# Patient Record
Sex: MALE | Race: WHITE | NOT HISPANIC OR LATINO | Employment: FULL TIME | URBAN - METROPOLITAN AREA
[De-identification: names, ages, dates, MRNs, and addresses within clinical notes are randomized per-mention and may not be internally consistent; named-entity substitution may affect disease eponyms.]

---

## 2021-03-08 ENCOUNTER — TELEPHONE (OUTPATIENT)
Dept: OTHER | Facility: OTHER | Age: 26
End: 2021-03-08

## 2021-03-08 ENCOUNTER — OFFICE VISIT (OUTPATIENT)
Dept: URGENT CARE | Facility: CLINIC | Age: 26
End: 2021-03-08

## 2021-03-08 DIAGNOSIS — Z11.59 SCREENING EXAMINATION FOR POLIOMYELITIS: Primary | ICD-10-CM

## 2021-03-08 LAB — SARS-COV-2 RNA RESP QL NAA+PROBE: POSITIVE

## 2021-03-08 PROCEDURE — U0003 INFECTIOUS AGENT DETECTION BY NUCLEIC ACID (DNA OR RNA); SEVERE ACUTE RESPIRATORY SYNDROME CORONAVIRUS 2 (SARS-COV-2) (CORONAVIRUS DISEASE [COVID-19]), AMPLIFIED PROBE TECHNIQUE, MAKING USE OF HIGH THROUGHPUT TECHNOLOGIES AS DESCRIBED BY CMS-2020-01-R: HCPCS

## 2021-03-08 PROCEDURE — U0005 INFEC AGEN DETEC AMPLI PROBE: HCPCS

## 2021-03-09 ENCOUNTER — TELEPHONE (OUTPATIENT)
Dept: OTHER | Facility: OTHER | Age: 26
End: 2021-03-09

## 2021-03-09 NOTE — TELEPHONE ENCOUNTER
The patient was called for notification of a test result for COVID-19  The patient did not answer the phone and a voicemail was left requesting a call back to [phone], Option 7

## 2021-03-09 NOTE — TELEPHONE ENCOUNTER
Second attempt: The patient was called for notification of a test result for COVID-19  The patient did not answer the phone and a voicemail was left requesting a call back to [phone], Option 7

## 2021-03-09 NOTE — RESULT ENCOUNTER NOTE
Third call attempt  Patient was called for COVID-19 test results by Joss Yang RN  A voicemail was received and a message was left to call back

## 2021-03-09 NOTE — TELEPHONE ENCOUNTER
4th attempt  Pt removed from call queue  The patient was called for notification of a test result for COVID-19       The patient did not answer the phone and phone continues to ring  Unable to leave voicemail at this time

## 2021-03-09 NOTE — TELEPHONE ENCOUNTER
The patient was called for notification of a test result for COVID-19  The patient did not answer the phone  The phone did not ring, I called into the department and had one of the operators attempt to call but she did not hear any ringing either  Unable to leave message

## 2021-03-09 NOTE — TELEPHONE ENCOUNTER
1st attempt  The patient was called for notification of a test result for COVID-19  The patient did not answer the phone and phone continues to ring  Unable to leave voicemail at this time

## 2024-03-29 ENCOUNTER — APPOINTMENT (OUTPATIENT)
Dept: RADIOLOGY | Facility: CLINIC | Age: 29
End: 2024-03-29
Payer: OTHER GOVERNMENT

## 2024-03-29 ENCOUNTER — OFFICE VISIT (OUTPATIENT)
Dept: URGENT CARE | Facility: CLINIC | Age: 29
End: 2024-03-29
Payer: OTHER GOVERNMENT

## 2024-03-29 VITALS
RESPIRATION RATE: 18 BRPM | TEMPERATURE: 97.3 F | SYSTOLIC BLOOD PRESSURE: 138 MMHG | HEART RATE: 73 BPM | OXYGEN SATURATION: 99 % | DIASTOLIC BLOOD PRESSURE: 68 MMHG

## 2024-03-29 DIAGNOSIS — M25.571 ACUTE RIGHT ANKLE PAIN: ICD-10-CM

## 2024-03-29 DIAGNOSIS — S93.401A SPRAIN OF RIGHT ANKLE, UNSPECIFIED LIGAMENT, INITIAL ENCOUNTER: Primary | ICD-10-CM

## 2024-03-29 PROCEDURE — 73610 X-RAY EXAM OF ANKLE: CPT

## 2024-03-29 PROCEDURE — 99213 OFFICE O/P EST LOW 20 MIN: CPT | Performed by: NURSE PRACTITIONER

## 2024-03-29 PROCEDURE — G0463 HOSPITAL OUTPT CLINIC VISIT: HCPCS | Performed by: NURSE PRACTITIONER

## 2024-03-29 NOTE — PROGRESS NOTES
[facility]        NAME: Kiko Osorio is a 28 y.o. male  : 1995    MRN: [id number]  DATE: 2024  TIME: 1:35 PM    Assessment and Plan   Sprain of right ankle, unspecified ligament, initial encounter [S93.401A]  1. Sprain of right ankle, unspecified ligament, initial encounter  Ambulatory Referral to Orthopedic Surgery      2. Acute right ankle pain  XR ankle 3+ vw right    Ambulatory Referral to Orthopedic Surgery        Acute symptomatic following a fall that occurred yesterday.  Wet read x-ray negative will Heed radiology read.  Discussed with patient ice elevate as tolerates over-the-counter ibuprofen as needed for pain cam boot applied in office.  Refusing crutches at this time.  Will place referral to orthopedics with no improvement or worsening symptoms should follow-up    Patient Instructions       Follow up with PCP in 3-5 days.  Proceed to  ER if symptoms worsen.    If tests have been performed at [facility], our office will contact you with results if changes need to be made to the care plan discussed with you at the visit.  [facility]    Chief Complaint     Chief Complaint   Patient presents with   • Ankle Pain     Patient c/o right ankle pain after he twisted it when taking out the trash.         History of Present Illness       Patient is a 28-year-old male arrives with right ankle pain.  Reports that he was taking his trash out he missed a step and twisted his ankle.  Now with swelling and minor bruising.  Does report hearing a pop.  Most pain anterior ankle and lateral aspect.  Difficulty with weightbearing and walking.  Denies numbness tingling neurovascular intact        Review of Systems   Review of Systems   Constitutional:  Negative for activity change, appetite change, chills, fatigue and fever.   HENT:  Negative for congestion, rhinorrhea, sinus pressure, sinus pain and sore throat.    Respiratory:  Negative for cough, chest  tightness and shortness of breath.    Gastrointestinal:  Negative for constipation, diarrhea, nausea and vomiting.   Musculoskeletal:  Positive for arthralgias, gait problem, joint swelling and myalgias.   Skin:  Negative for color change, pallor and rash.   Neurological:  Negative for dizziness, syncope, weakness, light-headedness and headaches.   Hematological:  Negative for adenopathy.   Psychiatric/Behavioral:  Negative for agitation and confusion.          Current Medications     No current outpatient medications on file.    Current Allergies     Allergies as of 03/29/2024   • (No Known Allergies)            The following portions of the patient's history were reviewed and updated as appropriate: allergies, current medications, past family history, past medical history, past social history, past surgical history and problem list.     History reviewed. No pertinent past medical history.    History reviewed. No pertinent surgical history.    History reviewed. No pertinent family history.      Medications have been verified.        Objective   /68   Pulse 73   Temp (!) 97.3 °F (36.3 °C)   Resp 18   SpO2 99%   No LMP for male patient.       Physical Exam     Physical Exam  Vitals and nursing note reviewed.   Constitutional:       General: He is not in acute distress.     Appearance: Normal appearance. He is not ill-appearing, toxic-appearing or diaphoretic.   HENT:      Head: Normocephalic and atraumatic.      Nose: No congestion or rhinorrhea.      Mouth/Throat:      Mouth: Mucous membranes are moist.   Eyes:      General: No scleral icterus.        Right eye: No discharge.         Left eye: No discharge.      Conjunctiva/sclera: Conjunctivae normal.   Pulmonary:      Effort: Pulmonary effort is normal. No respiratory distress.   Musculoskeletal:         General: Swelling, tenderness and signs of injury present.      Cervical back: Normal range of motion.      Right ankle: Swelling and ecchymosis  present. Tenderness present over the lateral malleolus. Decreased range of motion. Normal pulse.   Skin:     General: Skin is dry.   Neurological:      Mental Status: He is alert and oriented to person, place, and time.   Psychiatric:         Mood and Affect: Mood normal.         Behavior: Behavior normal.         Thought Content: Thought content normal.         Judgment: Judgment normal.

## 2024-04-02 ENCOUNTER — TELEPHONE (OUTPATIENT)
Age: 29
End: 2024-04-02

## 2024-04-04 ENCOUNTER — TELEPHONE (OUTPATIENT)
Age: 29
End: 2024-04-04

## 2024-04-04 ENCOUNTER — OFFICE VISIT (OUTPATIENT)
Age: 29
End: 2024-04-04
Payer: OTHER GOVERNMENT

## 2024-04-04 VITALS
BODY MASS INDEX: 23.74 KG/M2 | DIASTOLIC BLOOD PRESSURE: 74 MMHG | WEIGHT: 185 LBS | SYSTOLIC BLOOD PRESSURE: 118 MMHG | HEART RATE: 66 BPM | HEIGHT: 74 IN

## 2024-04-04 DIAGNOSIS — M25.571 ACUTE RIGHT ANKLE PAIN: ICD-10-CM

## 2024-04-04 DIAGNOSIS — S93.401A SPRAIN OF RIGHT ANKLE, UNSPECIFIED LIGAMENT, INITIAL ENCOUNTER: Primary | ICD-10-CM

## 2024-04-04 PROCEDURE — 99203 OFFICE O/P NEW LOW 30 MIN: CPT | Performed by: ORTHOPAEDIC SURGERY

## 2024-04-04 NOTE — TELEPHONE ENCOUNTER
I called Kiko back to inform him the work note is in my chart. I offered to send it to to an email.

## 2024-04-04 NOTE — TELEPHONE ENCOUNTER
Caller: Patient    Doctor: Agustin    Reason for call: Patient requested that the doctor add to the work status note that he has a tear in ankle and that he isn't allowed to drive while wearing the boot. Please upload to [employer].     Call back#: [phone]

## 2024-04-04 NOTE — LETTER
April 4, 2024     Patient: Kiko Osorio  YOB: 1995  Date of Visit: 4/4/2024      To Whom it May Concern:    Kiko Osorio is under my professional care. Kiko was seen in my office on 4/4/2024. Patient is currently dealing with a right ankle sprain (ATFL most likely the ligament injured). Kiko should wear the CAM boot for the next 2-4 weeks. Patient should not drive with the CAM boot. Patient should then have gradual resumption of full activity at 8-10 weeks. Patient should be allowed to wear comfortable/supportive shoes for the next 3 months.     If you have any questions or concerns, please don't hesitate to call.         Sincerely,          Rudi Velez MD        CC: No Recipients

## 2024-04-04 NOTE — LETTER
April 4, 2024     Patient: Kiko Osorio  YOB: 1995  Date of Visit: 4/4/2024      To Whom it May Concern:    Kiko Osorio is under my professional care. Kiko was seen in my office on 4/4/2024. Kiko should wear the CAM boot for the next 2-4 weeks. Patient should then have gradual resumption of full activity at 8-10 weeks. Patient should be allowed to wear comfortable/supportive shoes for the next 3 months.    If you have any questions or concerns, please don't hesitate to call.         Sincerely,          Rudi Velez MD        CC: No Recipients

## 2024-10-03 ENCOUNTER — OFFICE VISIT (OUTPATIENT)
Dept: URGENT CARE | Facility: CLINIC | Age: 29
End: 2024-10-03
Payer: OTHER GOVERNMENT

## 2024-10-03 VITALS
TEMPERATURE: 97.7 F | OXYGEN SATURATION: 98 % | HEART RATE: 80 BPM | RESPIRATION RATE: 16 BRPM | DIASTOLIC BLOOD PRESSURE: 80 MMHG | SYSTOLIC BLOOD PRESSURE: 126 MMHG

## 2024-10-03 DIAGNOSIS — M62.838 MUSCLE SPASMS OF NECK: Primary | ICD-10-CM

## 2024-10-03 PROCEDURE — 99213 OFFICE O/P EST LOW 20 MIN: CPT | Performed by: PHYSICIAN ASSISTANT

## 2024-10-03 PROCEDURE — G0463 HOSPITAL OUTPT CLINIC VISIT: HCPCS | Performed by: PHYSICIAN ASSISTANT

## 2024-10-03 RX ORDER — CYCLOBENZAPRINE HCL 5 MG
5 TABLET ORAL 3 TIMES DAILY PRN
Qty: 21 TABLET | Refills: 0 | Status: SHIPPED | OUTPATIENT
Start: 2024-10-03 | End: 2024-10-10

## 2024-10-03 NOTE — LETTER
October 3, 2024     Patient: Kiko Osorio  YOB: 1995  Date of Visit: 10/3/2024      To Whom it May Concern:    Kiko Osorio is under my professional care. Kiko was seen in my office on 10/3/2024.  Please excuse Kiko for 10/3/2024 and 10/4/2024    If you have any questions or concerns, please don't hesitate to call.         Sincerely,          Chicho Rondon PA-C        CC: No Recipients

## 2024-10-03 NOTE — PROGRESS NOTES
[facility]        NAME: Kiko Osorio is a 29 y.o. male  : 1995    MRN: [id number]  DATE: October 3, 2024  TIME: 9:48 AM    Assessment and Plan   Muscle spasms of neck [M62.838]  1. Muscle spasms of neck  cyclobenzaprine (FLEXERIL) 5 mg tablet            Patient Instructions       Follow up with PCP in 3-5 days.  Proceed to  ER if symptoms worsen.    If tests have been performed at [facility], our office will contact you with results if changes need to be made to the care plan discussed with you at the visit.  You can review your full results on [facility].    Chief Complaint     Chief Complaint   Patient presents with    Neck Pain     Patient states he held in a sneeze last night, and immediately felt pain in his R neck/trap area. Patient states the pain starts in his neck and travels down to his R shoulder and into his arm. Patient can move his arm, but feels shooting pain in his shoulder/neck area.          History of Present Illness       Patient presents with neck pain that developed after holding in a sneeze last night.  Can't move the neck well due to the pain.  Pain radiates down the right arm into the hand. Mild numbness in the arm. Movements of the arm also cause pain to worsen.   H/o C7 and T1 neck fracture years ago.   Denies weakness, urinary/bowel incontinence.     Neck Pain   Pertinent negatives include no headaches, numbness or weakness.       Review of Systems   Review of Systems   Eyes:  Negative for visual disturbance.   Musculoskeletal:  Positive for neck pain.   Neurological:  Negative for weakness, numbness and headaches.         Current Medications       Current Outpatient Medications:     cyclobenzaprine (FLEXERIL) 5 mg tablet, Take 1 tablet (5 mg total) by mouth 3 (three) times a day as needed for muscle spasms for up to 7 days, Disp: 21 tablet, Rfl: 0    Current Allergies     Allergies as of 10/03/2024    (No Known Allergies)            The following portions of the  patient's history were reviewed and updated as appropriate: allergies, current medications, past family history, past medical history, past social history, past surgical history and problem list.     No past medical history on file.    No past surgical history on file.    No family history on file.      Medications have been verified.        Objective   /80   Pulse 80   Temp 97.7 °F (36.5 °C)   Resp 16   SpO2 98%   No LMP for male patient.       Physical Exam     Physical Exam  Constitutional:       Appearance: Normal appearance.   Neck:      Comments: Right trapezius and paraspinal muscles of the neck with spasm and tenderness to palpation over area.  Active range of motion of the neck severely limited due to pain but can initiate partial movements in all ranges.  No shelving or deformity noted.  Full active range of motion of the upper extremities but mildly decreased  strength on the right side compared to the left.  Neurovascularly intact distally.  Cardiovascular:      Rate and Rhythm: Normal rate and regular rhythm.      Heart sounds: Normal heart sounds.   Pulmonary:      Effort: Pulmonary effort is normal.      Breath sounds: Normal breath sounds.   Musculoskeletal:         General: Normal range of motion.      Cervical back: Normal range of motion. Tenderness present.   Neurological:      Mental Status: He is alert.   Psychiatric:         Mood and Affect: Mood normal.         Behavior: Behavior normal.